# Patient Record
Sex: MALE | Race: WHITE | Employment: FULL TIME | URBAN - METROPOLITAN AREA
[De-identification: names, ages, dates, MRNs, and addresses within clinical notes are randomized per-mention and may not be internally consistent; named-entity substitution may affect disease eponyms.]

---

## 2021-03-18 ENCOUNTER — APPOINTMENT (OUTPATIENT)
Dept: GENERAL RADIOLOGY | Age: 31
End: 2021-03-18
Payer: COMMERCIAL

## 2021-03-18 ENCOUNTER — HOSPITAL ENCOUNTER (EMERGENCY)
Age: 31
Discharge: HOME | End: 2021-03-18
Attending: EMERGENCY MEDICINE
Payer: COMMERCIAL

## 2021-03-18 VITALS
SYSTOLIC BLOOD PRESSURE: 156 MMHG | HEIGHT: 72 IN | WEIGHT: 254 LBS | TEMPERATURE: 98.2 F | OXYGEN SATURATION: 96 % | RESPIRATION RATE: 17 BRPM | HEART RATE: 110 BPM | DIASTOLIC BLOOD PRESSURE: 93 MMHG | BODY MASS INDEX: 34.4 KG/M2

## 2021-03-18 DIAGNOSIS — R07.9 CHEST PAIN, UNSPECIFIED TYPE: Primary | ICD-10-CM

## 2021-03-18 LAB
ABSOLUTE EOS #: 0.1 K/UL (ref 0–0.4)
ABSOLUTE IMMATURE GRANULOCYTE: NORMAL K/UL (ref 0–0.3)
ABSOLUTE LYMPH #: 2.3 K/UL (ref 1–4.8)
ABSOLUTE MONO #: 0.5 K/UL (ref 0.1–1.3)
ALBUMIN SERPL-MCNC: 4.6 G/DL (ref 3.5–5.2)
ALBUMIN/GLOBULIN RATIO: NORMAL (ref 1–2.5)
ALP BLD-CCNC: 70 U/L (ref 40–129)
ALT SERPL-CCNC: 16 U/L (ref 5–41)
ANION GAP SERPL CALCULATED.3IONS-SCNC: 13 MMOL/L (ref 9–17)
AST SERPL-CCNC: 22 U/L
BASOPHILS # BLD: 1 % (ref 0–2)
BASOPHILS ABSOLUTE: 0.1 K/UL (ref 0–0.2)
BILIRUB SERPL-MCNC: 0.33 MG/DL (ref 0.3–1.2)
BILIRUBIN DIRECT: 0.16 MG/DL
BILIRUBIN, INDIRECT: 0.17 MG/DL (ref 0–1)
BUN BLDV-MCNC: 10 MG/DL (ref 6–20)
BUN/CREAT BLD: ABNORMAL (ref 9–20)
CALCIUM SERPL-MCNC: 9 MG/DL (ref 8.6–10.4)
CHLORIDE BLD-SCNC: 105 MMOL/L (ref 98–107)
CO2: 22 MMOL/L (ref 20–31)
CREAT SERPL-MCNC: 0.75 MG/DL (ref 0.7–1.2)
D-DIMER QUANTITATIVE: 0.33 MG/L FEU (ref 0–0.59)
DIFFERENTIAL TYPE: NORMAL
EOSINOPHILS RELATIVE PERCENT: 1 % (ref 0–4)
GFR AFRICAN AMERICAN: >60 ML/MIN
GFR NON-AFRICAN AMERICAN: >60 ML/MIN
GFR SERPL CREATININE-BSD FRML MDRD: ABNORMAL ML/MIN/{1.73_M2}
GFR SERPL CREATININE-BSD FRML MDRD: ABNORMAL ML/MIN/{1.73_M2}
GLOBULIN: NORMAL G/DL (ref 1.5–3.8)
GLUCOSE BLD-MCNC: 125 MG/DL (ref 70–99)
HCT VFR BLD CALC: 47.8 % (ref 41–53)
HEMOGLOBIN: 16.5 G/DL (ref 13.5–17.5)
IMMATURE GRANULOCYTES: NORMAL %
LIPASE: 27 U/L (ref 13–60)
LYMPHOCYTES # BLD: 31 % (ref 24–44)
MCH RBC QN AUTO: 31 PG (ref 26–34)
MCHC RBC AUTO-ENTMCNC: 34.4 G/DL (ref 31–37)
MCV RBC AUTO: 90.1 FL (ref 80–100)
MONOCYTES # BLD: 7 % (ref 1–7)
MYOGLOBIN: 28 NG/ML (ref 28–72)
MYOGLOBIN: 29 NG/ML (ref 28–72)
NRBC AUTOMATED: NORMAL PER 100 WBC
PDW BLD-RTO: 12.9 % (ref 11.5–14.9)
PLATELET # BLD: 261 K/UL (ref 150–450)
PLATELET ESTIMATE: NORMAL
PMV BLD AUTO: 7.8 FL (ref 6–12)
POTASSIUM SERPL-SCNC: 3.7 MMOL/L (ref 3.7–5.3)
RBC # BLD: 5.31 M/UL (ref 4.5–5.9)
RBC # BLD: NORMAL 10*6/UL
SEG NEUTROPHILS: 60 % (ref 36–66)
SEGMENTED NEUTROPHILS ABSOLUTE COUNT: 4.4 K/UL (ref 1.3–9.1)
SODIUM BLD-SCNC: 140 MMOL/L (ref 135–144)
TOTAL PROTEIN: 6.6 G/DL (ref 6.4–8.3)
TROPONIN INTERP: NORMAL
TROPONIN INTERP: NORMAL
TROPONIN T: NORMAL NG/ML
TROPONIN T: NORMAL NG/ML
TROPONIN, HIGH SENSITIVITY: <6 NG/L (ref 0–22)
TROPONIN, HIGH SENSITIVITY: <6 NG/L (ref 0–22)
WBC # BLD: 7.3 K/UL (ref 3.5–11)
WBC # BLD: NORMAL 10*3/UL

## 2021-03-18 PROCEDURE — 83874 ASSAY OF MYOGLOBIN: CPT

## 2021-03-18 PROCEDURE — 36415 COLL VENOUS BLD VENIPUNCTURE: CPT

## 2021-03-18 PROCEDURE — 80048 BASIC METABOLIC PNL TOTAL CA: CPT

## 2021-03-18 PROCEDURE — 85025 COMPLETE CBC W/AUTO DIFF WBC: CPT

## 2021-03-18 PROCEDURE — 71046 X-RAY EXAM CHEST 2 VIEWS: CPT

## 2021-03-18 PROCEDURE — 85379 FIBRIN DEGRADATION QUANT: CPT

## 2021-03-18 PROCEDURE — 93005 ELECTROCARDIOGRAM TRACING: CPT | Performed by: EMERGENCY MEDICINE

## 2021-03-18 PROCEDURE — 80076 HEPATIC FUNCTION PANEL: CPT

## 2021-03-18 PROCEDURE — 99283 EMERGENCY DEPT VISIT LOW MDM: CPT

## 2021-03-18 PROCEDURE — 83690 ASSAY OF LIPASE: CPT

## 2021-03-18 PROCEDURE — 84484 ASSAY OF TROPONIN QUANT: CPT

## 2021-03-18 PROCEDURE — 6370000000 HC RX 637 (ALT 250 FOR IP): Performed by: EMERGENCY MEDICINE

## 2021-03-18 RX ORDER — ASPIRIN 81 MG/1
324 TABLET, CHEWABLE ORAL ONCE
Status: COMPLETED | OUTPATIENT
Start: 2021-03-18 | End: 2021-03-18

## 2021-03-18 RX ADMIN — ASPIRIN 324 MG: 81 TABLET, CHEWABLE ORAL at 19:37

## 2021-03-18 ASSESSMENT — ENCOUNTER SYMPTOMS
VOMITING: 0
COLOR CHANGE: 0
CONSTIPATION: 0
EYE REDNESS: 0
CHEST TIGHTNESS: 0
SORE THROAT: 0
RHINORRHEA: 0
EYE PAIN: 0
SINUS PRESSURE: 0
WHEEZING: 0
NAUSEA: 0
TROUBLE SWALLOWING: 0
SHORTNESS OF BREATH: 1
DIARRHEA: 0
ABDOMINAL PAIN: 0
FACIAL SWELLING: 0
EYE DISCHARGE: 0
COUGH: 0
BACK PAIN: 0
BLOOD IN STOOL: 0

## 2021-03-18 ASSESSMENT — PAIN DESCRIPTION - ORIENTATION: ORIENTATION: LEFT

## 2021-03-18 ASSESSMENT — PAIN DESCRIPTION - FREQUENCY: FREQUENCY: CONTINUOUS

## 2021-03-18 NOTE — ED PROVIDER NOTES
[address]  eMERGENCY dEPARTMENT eNCOUnter      Pt Name: Dionna Rose  MRN: [id number]  [place] 1990  Date of evaluation: 3/18/21      CHIEF COMPLAINT       Chief Complaint   Patient presents with    Chest Pain    Dizziness    Shortness of Breath         HISTORY OF PRESENT ILLNESS    Dionna Rose is a 31 y.o. male who presents complaining of chest pain. Patient states he had sudden onset of chest pain left-sided going up into his neck with tingling in his hand on the left and shortness of breath and felt like he was going to pass out 20 minutes before arrival while he was eating. Patient states he did not have a choking episode and does not feel like anything got stuck. Patient still feels like this. Patient states that he has had symptoms like this before and is actually had reportedly 2 heart attacks but is never had a cath or had stents placed. 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 Patient has no pain or swelling in the legs. Patient has never had a blood clot before. REVIEW OF SYSTEMS       Review of Systems   Constitutional: Negative for activity change, appetite change, chills, diaphoresis and fever. HENT: Negative for congestion, ear pain, facial swelling, nosebleeds, rhinorrhea, sinus pressure, sore throat and trouble swallowing. Eyes: Negative for pain, discharge and redness. Respiratory: Positive for shortness of breath. Negative for cough, chest tightness and wheezing. Cardiovascular: Positive for chest pain. Negative for palpitations and leg swelling. Gastrointestinal: Negative for abdominal pain, blood in stool, constipation, diarrhea, nausea and vomiting. Genitourinary: Negative for difficulty urinating, dysuria, flank pain, frequency, genital sores and hematuria. Musculoskeletal: Negative for arthralgias, back pain, gait problem, joint swelling, myalgias and neck pain.    Skin: Negative for color change, pallor, rash and Normal mammo wound.   Neurological: Positive for dizziness and light-headedness. Negative for tremors, seizures, syncope, speech difficulty, weakness, numbness and headaches. Psychiatric/Behavioral: Negative for confusion, decreased concentration, hallucinations, self-injury, sleep disturbance and suicidal ideas. PAST MEDICAL HISTORY   History reviewed. No pertinent past medical history. SURGICAL HISTORY     History reviewed. No pertinent surgical history. CURRENT MEDICATIONS       Previous Medications    No medications on file       ALLERGIES     is allergic to cinnamon. FAMILY HISTORY     [unfilled]     SOCIAL HISTORY      reports that he has never smoked. He does not have any smokeless tobacco history on file. He reports current alcohol use. PHYSICAL EXAM     INITIAL VITALS: BP (!) 156/93   Pulse 110   Temp 98.2 °F (36.8 °C) (Temporal)   Resp 17   Ht 6' (1.829 m)   Wt 254 lb (115.2 kg)   SpO2 96%   BMI 34.45 kg/m²      Physical Exam  Vitals signs and nursing note reviewed. Constitutional:       General: He is not in acute distress. Appearance: He is well-developed. He is not diaphoretic. HENT:      Head: Normocephalic and atraumatic. Eyes:      General: No scleral icterus. Right eye: No discharge. Left eye: No discharge. Conjunctiva/sclera: Conjunctivae normal.      Pupils: Pupils are equal, round, and reactive to light. Cardiovascular:      Rate and Rhythm: Normal rate and regular rhythm. Heart sounds: Normal heart sounds. No murmur. No friction rub. No gallop. Pulmonary:      Effort: Pulmonary effort is normal. No respiratory distress. Breath sounds: Normal breath sounds. No wheezing or rales. Chest:      Chest wall: Tenderness (Left side) present. Abdominal:      General: Bowel sounds are normal. There is no distension. Palpations: Abdomen is soft. There is no mass. Tenderness: There is abdominal tenderness (Left upper and lower quadrants). There is no guarding or rebound. Musculoskeletal: Normal range of motion. General: No tenderness. Skin:     General: Skin is warm and dry. Coloration: Skin is not pale. Findings: No erythema or rash. Neurological:      Mental Status: He is alert and oriented to person, place, and time. Cranial Nerves: No cranial nerve deficit. Sensory: No sensory deficit. Motor: No abnormal muscle tone. Coordination: Coordination normal.      Deep Tendon Reflexes: Reflexes normal.   Psychiatric:         Behavior: Behavior normal.         Thought Content: Thought content normal.         Judgment: Judgment normal.         MEDICAL DECISION MAKING:     Patient symptoms could be related to cardiac stent supposedly has prior cardiac issues though I do not know if this is true because patient is out of town. We will do a full work-up on him. My other concern would be for PE. We will get a D-dimer because he is low risk otherwise. DIAGNOSTIC RESULTS     EKG: All EKG's are interpreted by the Emergency Department Physician who either signs or Co-signs this chart in the absence of a cardiologist.    EKG Interpretation    Interpreted by emergency department physician    Rhythm: sinus tachycardia  Rate: tachycardia  Axis: normal  Ectopy: none  Conduction: normal  ST Segments: nonspecific changes  T Waves: non specific changes  Q Waves: none    Clinical Impression: EKG: nonspecific ST and T waves changes, sinus tachycardia. Crystal Henderson        RADIOLOGY:All plain film, CT, MRI, and formal ultrasound images (except ED bedside ultrasound)are read by the radiologist and interpretations are directly viewed by the emergency physician. Xr Chest (2 Vw)    Result Date: 3/18/2021  EXAMINATION: TWO XRAY VIEWS OF THE CHEST 3/18/2021 6:05 pm COMPARISON: None.  HISTORY: ORDERING SYSTEM PROVIDED HISTORY: Chest Pain TECHNOLOGIST PROVIDED HISTORY: Chest Pain Reason for Exam: PT CO CP with SOB and dizziness X several days. Acuity: Acute Type of Exam: Initial FINDINGS: Upright frontal and lateral view chest radiographs were obtained. The heart size, mediastinal contour and pleural spaces are within normal limits. The lungs are clear. There is no focal consolidation or pneumothorax. The pulmonary vascular pattern is within normal limits. No significant thoracic osseous abnormality. Clear lungs. No acute cardiopulmonary abnormality. LABS: All lab results were reviewed bymyself, and all abnormals are listed below. Labs Reviewed   BASIC METABOLIC PANEL - Abnormal; Notable for the following components:       Result Value    Glucose 125 (*)     All other components within normal limits   CBC WITH AUTO DIFFERENTIAL   D-DIMER, QUANTITATIVE   TROP/MYOGLOBIN   LIPASE   HEPATIC FUNCTION PANEL   TROP/MYOGLOBIN         EMERGENCY DEPARTMENT COURSE:   Vitals:    Vitals:    03/18/21 1828   BP: (!) 156/93   Pulse: 110   Resp: 17   Temp: 98.2 °F (36.8 °C)   TempSrc: Temporal   SpO2: 96%   Weight: 254 lb (115.2 kg)   Height: 6' (1.829 m)       The patient was given the following medications while in the emergency department:     Orders Placed This Encounter   Medications    aspirin chewable tablet 324 mg       -------------------------  8:40 PM EDT  Patient was reevaluated and states he is feeling much better at this time. Patient was updated on results. Because the patient's age and his blood work and everything I do not believe he is having a heart attack his D-dimer was negative therefore I feel comfortable discharging the patient home as long as a second test are negative. CRITICAL CARE:   None    CONSULTS:  None    PROCEDURES:  None    FINAL IMPRESSION      1.  Chest pain, unspecified type          DISPOSITION/PLAN   DISPOSITION Discharge - Pending Orders Complete 03/18/2021 08:38:18 PM      PATIENT REFERRED TO:  [facility] ED  Marquez Ramirez [address] [place] [address] [facility]    If symptoms worsen      DISCHARGE MEDICATIONS:  New Prescriptions    No medications on file       (Please note that portions of this note were completed with a voice recognition program.  Efforts were made to edit the dictations but occasionally words are mis-transcribed.)    Soy Barreto MD  Attending Cary Scruggs MD  03/18/21 2641

## 2021-03-19 LAB
EKG ATRIAL RATE: 103 BPM
EKG ATRIAL RATE: 86 BPM
EKG P AXIS: 50 DEGREES
EKG P-R INTERVAL: 112 MS
EKG P-R INTERVAL: 140 MS
EKG Q-T INTERVAL: 322 MS
EKG Q-T INTERVAL: 350 MS
EKG QRS DURATION: 94 MS
EKG QRS DURATION: 96 MS
EKG QTC CALCULATION (BAZETT): 418 MS
EKG QTC CALCULATION (BAZETT): 421 MS
EKG R AXIS: -28 DEGREES
EKG R AXIS: 37 DEGREES
EKG T AXIS: -28 DEGREES
EKG T AXIS: -7 DEGREES
EKG VENTRICULAR RATE: 103 BPM
EKG VENTRICULAR RATE: 86 BPM